# Patient Record
Sex: FEMALE | HISPANIC OR LATINO
[De-identification: names, ages, dates, MRNs, and addresses within clinical notes are randomized per-mention and may not be internally consistent; named-entity substitution may affect disease eponyms.]

---

## 2021-02-04 ENCOUNTER — APPOINTMENT (OUTPATIENT)
Dept: GASTROENTEROLOGY | Facility: CLINIC | Age: 41
End: 2021-02-04
Payer: COMMERCIAL

## 2021-02-04 VITALS
WEIGHT: 110 LBS | TEMPERATURE: 98 F | DIASTOLIC BLOOD PRESSURE: 80 MMHG | HEART RATE: 89 BPM | HEIGHT: 62 IN | SYSTOLIC BLOOD PRESSURE: 120 MMHG | BODY MASS INDEX: 20.24 KG/M2 | OXYGEN SATURATION: 99 %

## 2021-02-04 DIAGNOSIS — D50.9 IRON DEFICIENCY ANEMIA, UNSPECIFIED: ICD-10-CM

## 2021-02-04 DIAGNOSIS — R10.13 EPIGASTRIC PAIN: ICD-10-CM

## 2021-02-04 PROBLEM — Z00.00 ENCOUNTER FOR PREVENTIVE HEALTH EXAMINATION: Status: ACTIVE | Noted: 2021-02-04

## 2021-02-04 PROCEDURE — 99204 OFFICE O/P NEW MOD 45 MIN: CPT

## 2021-02-04 PROCEDURE — 99072 ADDL SUPL MATRL&STAF TM PHE: CPT

## 2021-02-04 NOTE — HISTORY OF PRESENT ILLNESS
[FreeTextEntry1] : Ms. Gonzalez is a pleasant 40F no significant PMHx who is referred by Dr. Jennifer Fall for evaluation of iron deficiency anemia.\par \par States she was experiencing nonspecific dull or crampy LUQ pain for almost 6 months until August 2020 at which time it improved.  Unclear cause, had an U/S r/o nephrolithiasis, as per her report this was unremarkable.\par \par Pain recently began again over the past several months, often after eating, no clear association with any foods. No radiation, no alleviating factors. Tried probiotics without improvement.\par \par Had labs drawn in Mid January 2021 showing severe iron deficiency and anemia.  She started iron supplements around this time, had labs again drawn yesterday, awaiting results.\par \par Normal brown BM daily, no blood or black stool. No weight loss.  No N/V, heartburn, regurgitation, diarrhea, constipation.\par \par Still menstruating although not particularly heavy menses.\par \par No medications currently.\par \par Does not smoke or drink.\par \par No FHx of any GI malignancies.\par \par Grew up in [place].\par \par Three children, stays at home to care for them.

## 2024-10-15 NOTE — ASSESSMENT
Asymptomatic  Continue with medical management   [FreeTextEntry1] : Will obtain labs recently drawn.\par \par Stool guaiac cards given to patient today, will await results.  If heme occult positive, or if persistently iron deficient on repeat labs, will plan on an EGD/colonoscopy.\par \par If has responded on labs to iron supplementation, could check an H. pylori stool Ag and treat if positive.\par \par Thank you for referring Ms. DARYN AVALOS.  Please do not hesitate to call to further discuss his/her care.\par \par Note faxed to requesting MD.\par \par